# Patient Record
Sex: FEMALE | Employment: OTHER | URBAN - METROPOLITAN AREA
[De-identification: names, ages, dates, MRNs, and addresses within clinical notes are randomized per-mention and may not be internally consistent; named-entity substitution may affect disease eponyms.]

---

## 2018-05-22 ENCOUNTER — OFFICE VISIT (OUTPATIENT)
Dept: INFECTIOUS DISEASES | Facility: CLINIC | Age: 53
End: 2018-05-22
Payer: MEDICARE

## 2018-05-22 VITALS
DIASTOLIC BLOOD PRESSURE: 80 MMHG | SYSTOLIC BLOOD PRESSURE: 124 MMHG | HEART RATE: 101 BPM | TEMPERATURE: 98 F | HEIGHT: 58 IN

## 2018-05-22 DIAGNOSIS — Z93.0 TRACHEOSTOMY DEPENDENT: ICD-10-CM

## 2018-05-22 DIAGNOSIS — Z93.59 SUPRAPUBIC CATHETER: ICD-10-CM

## 2018-05-22 DIAGNOSIS — I69.90 CEREBROVASCULAR ACCIDENT, LATE EFFECTS: Chronic | ICD-10-CM

## 2018-05-22 DIAGNOSIS — G82.50 QUADRIPLEGIA: Primary | ICD-10-CM

## 2018-05-22 DIAGNOSIS — Z93.1 PEG (PERCUTANEOUS ENDOSCOPIC GASTROSTOMY) STATUS: ICD-10-CM

## 2018-05-22 PROCEDURE — 99203 OFFICE O/P NEW LOW 30 MIN: CPT | Mod: PBBFAC | Performed by: INTERNAL MEDICINE

## 2018-05-22 PROCEDURE — 99999 PR PBB SHADOW E&M-NEW PATIENT-LVL III: CPT | Mod: PBBFAC,,, | Performed by: INTERNAL MEDICINE

## 2018-05-22 PROCEDURE — 99203 OFFICE O/P NEW LOW 30 MIN: CPT | Mod: S$PBB,,, | Performed by: INTERNAL MEDICINE

## 2018-05-22 RX ORDER — MUPIROCIN 20 MG/G
OINTMENT TOPICAL
COMMUNITY
Start: 2018-05-17

## 2018-05-22 RX ORDER — NYSTATIN 100000 [USP'U]/ML
SUSPENSION ORAL
COMMUNITY
Start: 2018-05-17

## 2018-05-22 RX ORDER — AMOXICILLIN 250 MG/5ML
POWDER, FOR SUSPENSION ORAL
COMMUNITY
Start: 2018-04-22 | End: 2018-05-22 | Stop reason: ALTCHOICE

## 2018-05-22 NOTE — LETTER
May 22, 2018      Darian Dickson Jr., MD  [address] [place] [address] [address]vd  [address]  [place] [id number]           [facility] - Infectious Diseases  [address] [facility] [address]  [facility] [address]  Phone: [phone]  Fax: [phone]          Patient: Any Stephen   MR Number: [id number]   YOB: 1965   Date of Visit: 5/22/2018       Dear Dr. Darian Dickson Jr.:    Thank you for referring Any Stephen to me for evaluation. Attached you will find relevant portions of my assessment and plan of care.    If you have questions, please do not hesitate to call me. I look forward to following Any Stephen along with you.    Sincerely,    Maryana Rose MD    Enclosure  CC:  No Recipients    If you would like to receive this communication electronically, please contact [email or web address] or [phone] to request more information on [employer] access.    For providers and/or their staff who would like to refer a patient to [facility], please contact us through our one-stop-shop provider referral line, [facility], at [phone].    If you feel you have received this communication in error or would no longer like to receive these types of communications, please e-mail [email or web address]

## 2018-05-22 NOTE — PROGRESS NOTES
Subjective:      Chief Complaint: Positive tracheostomy cultures    History of Present Illness  52 y.o. 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      History obtained from  and caregiver.  Patient is non-verbal, but can communicate by moving her eyes.    At baseline, patient requires suctioning from trach about 2-3 times a day.  Her  cleans her inner cannula daily. About two months ago, she required increased suctioning - about 5-6 times a day.  Secretions also had a foul smell. She was placed on a course of amoxicillin with no resolution of foul-smelling secretions.  She was seen by her ENT physician who cultured bronchial secretions - grew multiple organisms so was referred to infectious diseases.     Outer cannula was removed and found to be covered with foul smelling secretions.  Since the exchange of the inner cannula, foul-smelling secretions have resolved.  She does still have frequent secretions, however,  thought related to outer cannula recently being replaced and the replacement irritating her.  Otherwise, she has not had any fevers, sweats.  She just had her baclofen pump removed.     ROS - unable to obtain as non-verbal    Objective:   Physical Exam   HENT:   Able to move eyes upward. Right gaze preference. Unable to move head.  Able to smile on right. Tracheostomy with no surrounding erythema, drainage.  Suctioning with clear secretions   Pulmonary/Chest: Effort normal.   Intermittent coughing with suctioning   Abdominal:   PEG tube   Neurological:   No spontaneous movement in upper or lower extremities.  Arm and leg atrophy from no use.   Skin:   No rashes visualized   Vitals reviewed.    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  Patient otherwise doing well clinically with no infectious signs or symptoms.    Plan:   - Monitor off antibiotics as patient doing well clinically, no fevers, sweats, oxygen requirement  - Advised  to avoid antibiotics as this can further promote the development of multidrug resistant organisms that are difficult to treat  - Continue daily care of inner cannula  - Follow-up with ENT    Maryana Rose MD MPH  C-Infectious Disease